# Patient Record
Sex: FEMALE | Race: BLACK OR AFRICAN AMERICAN | Employment: FULL TIME | URBAN - METROPOLITAN AREA
[De-identification: names, ages, dates, MRNs, and addresses within clinical notes are randomized per-mention and may not be internally consistent; named-entity substitution may affect disease eponyms.]

---

## 2022-12-29 ENCOUNTER — OFFICE VISIT (OUTPATIENT)
Dept: OCCUPATIONAL MEDICINE | Age: 27
End: 2022-12-29

## 2022-12-29 VITALS
BODY MASS INDEX: 38.45 KG/M2 | DIASTOLIC BLOOD PRESSURE: 75 MMHG | TEMPERATURE: 98.7 F | OXYGEN SATURATION: 96 % | SYSTOLIC BLOOD PRESSURE: 113 MMHG | RESPIRATION RATE: 16 BRPM | WEIGHT: 245 LBS | HEART RATE: 104 BPM | HEIGHT: 67 IN

## 2022-12-29 DIAGNOSIS — J02.0 STREP THROAT: ICD-10-CM

## 2022-12-29 DIAGNOSIS — J02.9 SORE THROAT: Primary | ICD-10-CM

## 2022-12-29 PROBLEM — R87.612 LGSIL ON PAP SMEAR OF CERVIX: Status: ACTIVE | Noted: 2022-10-10

## 2022-12-29 PROBLEM — E28.2 PCOS (POLYCYSTIC OVARIAN SYNDROME): Status: ACTIVE | Noted: 2022-08-29

## 2022-12-29 LAB
GROUP A STREP ANTIGEN, POC: NEGATIVE
INFLUENZA A ANTIGEN, POC: NEGATIVE
INFLUENZA B ANTIGEN, POC: NEGATIVE
VALID INTERNAL CONTROL, POC: YES
VALID INTERNAL CONTROL, POC: YES

## 2022-12-29 RX ORDER — AMOXICILLIN 500 MG/1
500 CAPSULE ORAL 2 TIMES DAILY
Qty: 20 CAPSULE | Refills: 0 | Status: SHIPPED | OUTPATIENT
Start: 2022-12-29 | End: 2023-01-08

## 2022-12-29 ASSESSMENT — ENCOUNTER SYMPTOMS
SWOLLEN GLANDS: 1
VISUAL CHANGE: 0
VOMITING: 0
ABDOMINAL PAIN: 0
COUGH: 0
RHINORRHEA: 0
TROUBLE SWALLOWING: 0
SINUS PAIN: 0
DIARRHEA: 0
SINUS PRESSURE: 0
SHORTNESS OF BREATH: 0
CHANGE IN BOWEL HABIT: 0
SORE THROAT: 1
NAUSEA: 0
CHEST TIGHTNESS: 0

## 2022-12-29 ASSESSMENT — PATIENT HEALTH QUESTIONNAIRE - PHQ9
2. FEELING DOWN, DEPRESSED OR HOPELESS: 0
SUM OF ALL RESPONSES TO PHQ QUESTIONS 1-9: 1
1. LITTLE INTEREST OR PLEASURE IN DOING THINGS: 1
SUM OF ALL RESPONSES TO PHQ9 QUESTIONS 1 & 2: 1
SUM OF ALL RESPONSES TO PHQ QUESTIONS 1-9: 1

## 2022-12-29 NOTE — PROGRESS NOTES
PROGRESS NOTE    SUBJECTIVE     Pam Hussein is a 32 y.o. female seen for:    Chief Complaint    Pharyngitis     . Patient states that her sore throat yesterday (started on the right and is now on both sides of the throat) and then had body aches, chills, and night sweats last night. Patient did a covid test at her work today and it was negative. Patient states that she hasn't had an appetite to eat but has been drinking fine despite the sore throat. Patient left work today because her temperature was 100.7F and she was feeling lightheaded. Pharyngitis  This is a new problem. The current episode started yesterday. The problem occurs constantly. The problem has been gradually worsening. Associated symptoms include chills, congestion, diaphoresis, fatigue, a fever, headaches, myalgias, a sore throat and swollen glands. Pertinent negatives include no abdominal pain, anorexia, arthralgias, change in bowel habit, chest pain, coughing, joint swelling, nausea, neck pain, numbness, rash, urinary symptoms, vertigo, visual change, vomiting or weakness. The symptoms are aggravated by drinking and eating. She has tried drinking for the symptoms. The treatment provided no relief. Current Outpatient Medications   Medication Sig Dispense Refill    amoxicillin (AMOXIL) 500 MG capsule Take 1 capsule by mouth 2 times daily for 10 days 20 capsule 0     No current facility-administered medications for this visit. No Known Allergies  Past Medical History:   Diagnosis Date    PCOS (polycystic ovarian syndrome)      No past surgical history on file.     Social History     Tobacco Use    Smoking status: Never    Smokeless tobacco: Never   Vaping Use    Vaping Use: Never used   Substance Use Topics    Alcohol use: Yes     Comment: 1-2 glasses of wine per week    Drug use: Never        Family History   Problem Relation Age of Onset    Leukemia Mother     Heart Surgery Father        Review of Systems   Constitutional: Positive for appetite change, chills, diaphoresis, fatigue and fever. HENT:  Positive for congestion, ear pain and sore throat. Negative for postnasal drip, rhinorrhea, sinus pressure, sinus pain and trouble swallowing. Respiratory:  Negative for cough, chest tightness and shortness of breath. Cardiovascular:  Negative for chest pain. Gastrointestinal:  Negative for abdominal pain, anorexia, change in bowel habit, diarrhea, nausea and vomiting. Musculoskeletal:  Positive for myalgias. Negative for arthralgias, joint swelling and neck pain. Skin:  Negative for rash. Neurological:  Positive for light-headedness and headaches. Negative for dizziness, vertigo, weakness and numbness. Some lightheadedness while at work. Psychiatric/Behavioral:  Positive for dysphoric mood. Negative for suicidal ideas. OBJECTIVE    /75 (Site: Right Upper Arm, Position: Sitting, Cuff Size: Medium Adult)   Pulse (!) 104   Temp 98.7 °F (37.1 °C) (Oral)   Resp 16   Ht 5' 7\" (1.702 m)   Wt 245 lb (111.1 kg)   LMP 12/19/2022 (Exact Date)   SpO2 96%   BMI 38.37 kg/m²    PHQ-9 Total Score: 1 (12/29/2022  1:09 PM)    Physical Exam  Vitals reviewed. Constitutional:       General: She is not in acute distress. Appearance: Normal appearance. She is ill-appearing. She is not toxic-appearing or diaphoretic. HENT:      Head: Normocephalic. Jaw: No trismus. Right Ear: Hearing, tympanic membrane, ear canal and external ear normal. There is impacted cerumen. Left Ear: Hearing, tympanic membrane, ear canal and external ear normal. There is no impacted cerumen. Ears:      Comments: Cerumen in the right ear canal obstructs the view of the right TM. Nose: Congestion and rhinorrhea present. Right Nostril: No foreign body, epistaxis or occlusion. Left Nostril: No foreign body, epistaxis or occlusion. Right Turbinates: Not enlarged, swollen or pale.       Left Turbinates: Not enlarged, swollen or pale. Right Sinus: No maxillary sinus tenderness or frontal sinus tenderness. Left Sinus: No maxillary sinus tenderness or frontal sinus tenderness. Comments: Slightly reddened turbinates     Mouth/Throat:      Mouth: Mucous membranes are moist.      Pharynx: Oropharynx is clear. Uvula midline. Posterior oropharyngeal erythema present. No pharyngeal swelling, oropharyngeal exudate or uvula swelling. Tonsils: Tonsillar exudate present. No tonsillar abscesses. 2+ on the right. 2+ on the left. Comments: Erythemic appearance of the posterior pharynx and tonsils; white patches on bilateral tonsis. Eyes:      General: No scleral icterus. Right eye: No discharge. Left eye: No discharge. Extraocular Movements: Extraocular movements intact. Conjunctiva/sclera: Conjunctivae normal.      Pupils: Pupils are equal, round, and reactive to light. Cardiovascular:      Rate and Rhythm: Normal rate and regular rhythm. Pulses: Normal pulses. Radial pulses are 2+ on the right side and 2+ on the left side. Heart sounds: Normal heart sounds. No murmur heard. No friction rub. No gallop. Pulmonary:      Effort: Pulmonary effort is normal. No respiratory distress. Breath sounds: Normal breath sounds. No stridor. No wheezing, rhonchi or rales. Chest:      Chest wall: No tenderness. Musculoskeletal:         General: No swelling. Normal range of motion. Cervical back: Normal range of motion and neck supple. No rigidity. Lymphadenopathy:      Cervical: Cervical adenopathy present. Right cervical: Superficial cervical adenopathy present. Left cervical: Superficial cervical adenopathy present. Skin:     General: Skin is warm and dry. Capillary Refill: Capillary refill takes less than 2 seconds. Coloration: Skin is not jaundiced or pale. Findings: No erythema or rash.    Neurological:      General: No focal deficit present. Mental Status: She is alert and oriented to person, place, and time. Mental status is at baseline. Motor: No weakness. Coordination: Coordination normal.      Gait: Gait normal.   Psychiatric:         Mood and Affect: Mood normal.         Behavior: Behavior normal.         Thought Content: Thought content normal.         Judgment: Judgment normal.       Results for orders placed or performed in visit on 12/29/22   AMB POC RAPID STREP TEST   Result Value Ref Range    Valid Internal Control, POC Yes     Group A Strep Antigen, POC Negative Negative   AMB POC RAPID INFLUENZA TEST   Result Value Ref Range    Valid Internal Control, POC Yes     Influenza A Antigen, POC Negative Negative    Influenza B Antigen, POC Negative Negative    Centor score = 4    No results found for: WBC, HGB, HCT, MCV, PLT    No results found for: NA, K, CL, CO2, BUN, CREATININE, GLUCOSE, CALCIUM, PROT, LABALBU, BILITOT, ALKPHOS, AST, ALT, LABGLOM, GFRAA, AGRATIO, GLOB        No results found for: NA, K, CL, CO2, BUN, CREATININE, GLUCOSE, CALCIUM     No results found for: LABA1C    No results found for: CHOL  No results found for: TRIG  No results found for: HDL  No results found for: LDLCHOLESTEROL, LDLCALC  No results found for: LABVLDL, VLDL  No results found for: CHOLHDLRATIO    No results found for: TSHFT4, TSH, TSHREFLEX, [id number]    ASSESSMENT and [address]. was seen today for pharyngitis. Diagnoses and all orders for this visit:    Sore throat  -     AMB POC RAPID STREP TEST  -     AMB POC RAPID INFLUENZA TEST    Strep throat  -     amoxicillin (AMOXIL) 500 MG capsule; Take 1 capsule by mouth 2 times daily for 10 days    Explained to patient that she tested negative for influenza and Strep A on rapid tests done in this office today, but that her symptoms clinically align with strep throat (swollen tonsils with exudate, swollen lymph nodes in the neck, fever, and no cough).      Instructed patient to fill the prescription for Amoxicillin to treat the strep infection. Take the antibiotics for the full 10 days and do not stop once feeling better. Strep infections can cause heart and kidney problems if not treated completely so it is important to take the antibiotics for the full 10 days. Change your tooth brush about 24-48 hours after starting the antibiotics. Have other family members in the house tested for strep if they develop symptoms as well. Stay home from work today and for at least 24 hours on antibiotics. You can return to work after you've been on the antibiotics for 24 hours, are fever free, and feeling better (note provided to be off until 12/31/22; next day to work is 1/3/22). If not feeling better after 2-3 days on antibiotics, follow up with the employee wellness center. Antibiotics have risks and benefits, and  one risk of antibiotics is that they remove good bacteria from the gastrointestinal (GI) tract and other areas of the body, leading to stomach upset, nausea, diarrhea, or yeast infections. Taking probiotics while taking the antibiotics and for 3-4 weeks after stopping the antibiotic can help to replenish your good bacteria and reduce or prevent the side effects of antibiotics. You can get probiotics through a capsule supplement (look brands like Align or Culturelle over the counter) or through foods in your diet (such as, yogurt, Kefir, kimchi (a Don fermented cabbage dish), kombucha (a fermented tea),  sauerkraut (fermented cabbage). Please follow up if you experience frequent or watery diarrhea or abdominal pain. Advised ibuprofen or acetaminophen (take according to package instructions) for the sore throat and body aches/fever.     Reminded patient about the [employer] benefit offered through [facility], where employees (and their dependents) get free phone support, as well as referrals for in-person consultations with clinical, legal and financial professionals. To access these resources, call [phone], text BONNIE to [phone], or visit [employer] (password: [email or web address]). Return to the clinic for persisting/worsening symptoms or new complaints that arise. Discussed signs and symptoms that would warrant immediate evaluation including, but not limited to severe headache, blurred vision, speech disturbance, difficulty with ambulation/gait, numbness, tingling, weakness, syncope, chest pain, or shortness of breath. Side effects and risk vs benefits associated with medications prescribed were discussed with patient who verbalized understanding. Pattent verbalized understanding and agreement with above plan of care. Counseled on benefits of having a primary care provider which includes, but is not limited to, continuity of care and having a medical home when concerns arise. Also enforced that onsite clinic policy states that we are not to take the place of a primary care provider. I have reviewed the patient's medication list, past medical, family, social, and surgical history in detail and updated the patient record appropriately.      JOHNNY Hua - NP

## 2022-12-29 NOTE — LETTER
[address] [place] [id number]  Phone: [phone]  Fax: [phone]    JOHNNY Porter NP        December 29, 2022     Patient: Negar Greenberg   YOB: 1995   Date of Visit: 12/29/2022       To Whom It May Concern: It is my medical opinion that Negar Greenberg may return to work on Tuesday, January 3rd as long as fever free for 24 hours and feeling better. .    If you have any questions or concerns, please don't hesitate to call.     Sincerely,        JOHNNY Porter NP

## 2023-01-03 ENCOUNTER — TELEPHONE (OUTPATIENT)
Dept: OCCUPATIONAL MEDICINE | Age: 28
End: 2023-01-03

## 2023-01-03 NOTE — TELEPHONE ENCOUNTER
Called patient to follow up on symptoms after taking amoxicillin. Left message for patient to follow up with the [facility] (employee wellness center) at [phone] if they need anything else.      JOHNNY Sun - NP

## 2023-01-09 ENCOUNTER — OFFICE VISIT (OUTPATIENT)
Dept: OCCUPATIONAL MEDICINE | Age: 28
End: 2023-01-09

## 2023-01-09 VITALS
RESPIRATION RATE: 18 BRPM | DIASTOLIC BLOOD PRESSURE: 78 MMHG | BODY MASS INDEX: 38.19 KG/M2 | OXYGEN SATURATION: 96 % | HEIGHT: 67 IN | SYSTOLIC BLOOD PRESSURE: 114 MMHG | HEART RATE: 127 BPM | TEMPERATURE: 99.4 F | WEIGHT: 243.3 LBS

## 2023-01-09 DIAGNOSIS — R35.0 URINARY FREQUENCY: ICD-10-CM

## 2023-01-09 DIAGNOSIS — J10.1 INFLUENZA A: ICD-10-CM

## 2023-01-09 DIAGNOSIS — Z32.00 ENCOUNTER FOR PREGNANCY TEST, RESULT UNKNOWN: ICD-10-CM

## 2023-01-09 DIAGNOSIS — R05.1 ACUTE COUGH: Primary | ICD-10-CM

## 2023-01-09 DIAGNOSIS — R09.82 POST-NASAL DRIP: ICD-10-CM

## 2023-01-09 LAB
BILIRUBIN, URINE, POC: NEGATIVE
BLOOD URINE, POC: ABNORMAL
GLUCOSE URINE, POC: NEGATIVE
HCG, PREGNANCY, URINE, POC: NEGATIVE
INFLUENZA A ANTIGEN, POC: POSITIVE
INFLUENZA B ANTIGEN, POC: NEGATIVE
KETONES, URINE, POC: 30
LEUKOCYTE ESTERASE, URINE, POC: ABNORMAL
NITRITE, URINE, POC: NEGATIVE
PH, URINE, POC: 6.5 (ref 4.6–8)
PROTEIN,URINE, POC: 100
SARS-COV-2 RNA, POC: NEGATIVE
SPECIFIC GRAVITY, URINE, POC: 1.02 (ref 1–1.03)
URINALYSIS CLARITY, POC: CLEAR
URINALYSIS COLOR, POC: ABNORMAL
UROBILINOGEN, POC: ABNORMAL
VALID INTERNAL CONTROL, POC: YES

## 2023-01-09 RX ORDER — FLUTICASONE PROPIONATE 50 MCG
2 SPRAY, SUSPENSION (ML) NASAL DAILY
Qty: 16 G | Refills: 0 | Status: SHIPPED | OUTPATIENT
Start: 2023-01-09

## 2023-01-09 RX ORDER — FLUTICASONE PROPIONATE 50 MCG
2 SPRAY, SUSPENSION (ML) NASAL DAILY
Qty: 16 G | Refills: 0 | Status: SHIPPED | OUTPATIENT
Start: 2023-01-09 | End: 2023-01-09

## 2023-01-09 ASSESSMENT — ENCOUNTER SYMPTOMS
SHORTNESS OF BREATH: 0
EYE PAIN: 0
NAUSEA: 0
VOMITING: 0
EYE DISCHARGE: 0
SWOLLEN GLANDS: 0
COUGH: 1
DIARRHEA: 0
RHINORRHEA: 1
BACK PAIN: 0
TROUBLE SWALLOWING: 0
ABDOMINAL PAIN: 0
EYE REDNESS: 0
CHEST TIGHTNESS: 0
EYE ITCHING: 0
SINUS PAIN: 1
SORE THROAT: 1
WHEEZING: 0

## 2023-01-09 ASSESSMENT — PATIENT HEALTH QUESTIONNAIRE - PHQ9
SUM OF ALL RESPONSES TO PHQ9 QUESTIONS 1 & 2: 1
SUM OF ALL RESPONSES TO PHQ QUESTIONS 1-9: 1
1. LITTLE INTEREST OR PLEASURE IN DOING THINGS: 1
SUM OF ALL RESPONSES TO PHQ QUESTIONS 1-9: 1
2. FEELING DOWN, DEPRESSED OR HOPELESS: 0
SUM OF ALL RESPONSES TO PHQ QUESTIONS 1-9: 1
SUM OF ALL RESPONSES TO PHQ QUESTIONS 1-9: 1

## 2023-01-09 NOTE — LETTER
[address] [place] [id number]  Phone: [phone]  Fax: [phone]    JOHNNY Williamson NP        January 9, 2023     Patient: Gita Ramirez   YOB: 1995   Date of Visit: 1/9/2023       To Whom It May Concern: It is my medical opinion that Gita Ramirez may return to work on Thursday, January 12th as long as fever free for 24 hours without antipyretics. Patient may return to work sooner if feeling better and fever free. If you have any questions or concerns, please don't hesitate to call.     Sincerely,        JOHNNY Williamson NP

## 2023-01-09 NOTE — PROGRESS NOTES
PROGRESS NOTE    SUBJECTIVE     Pam Hussein is a 32 y.o. female seen for:    Chief Complaint    URI     . Patient has nearly completed amoxicillin prescribed on 12/29/23 for clinical bacterial pharyngitis. Patient states that she started feeling better with the antibiotics but then on Friday 1/6/23, she felt much worse and started having a dry cough. Patient states that her sore throat returned as well but was not as bad as it was on 12/29/23. Patient states that she had a fever (102F), chills, and body aches last night with some mild sweating as well. Patient states that her cough is now productive and she has pain across the front of her chest when she coughs. Patient denies chest pain when she is not coughing. Patient feels very fatigued with exertion but denies shortness of breath. Patient states that she had a headache (over her forehead and crown) this morning and took 400 mg Ibuprofen for it with some improvement in her symptoms. Patient notes urinary frequency - especially last night - which she found odd since she hasn't been hydrating well. Patient denies any burning or pain with urination or urinary hesitancy or urgency. Patient denies any lower abdominal pain or back or flank pain as well. Patient states that she has not been hydrating well (has only had 2 cans of ginger ale and green juice yesterday). Patient has had a decreased appetite since Saturday and has only had small amounts of foods here and there since Saturday. Patient states that she will get the feeling that her heart is racing and feeling lightheaded since having the fever. URI   This is a new problem. The current episode started in the past 7 days. The problem has been unchanged. The maximum temperature recorded prior to her arrival was 102 - 102.9 F. The fever has been present for Less than 1 day. Associated symptoms include coughing, headaches, rhinorrhea, sinus pain and a sore throat.  Pertinent negatives include no abdominal pain, chest pain, congestion, diarrhea, dysuria, ear pain, joint pain, joint swelling, nausea, neck pain, plugged ear sensation, rash, sneezing, swollen glands, vomiting or wheezing. She has tried NSAIDs for the symptoms. The treatment provided mild relief. Current Outpatient Medications   Medication Sig Dispense Refill    fluticasone (FLONASE) 50 MCG/ACT nasal spray 2 sprays by Each Nostril route daily 16 g 0     No current facility-administered medications for this visit. No Known Allergies  Past Medical History:   Diagnosis Date    PCOS (polycystic ovarian syndrome)      History reviewed. No pertinent surgical history. Social History     Tobacco Use    Smoking status: Never    Smokeless tobacco: Never   Vaping Use    Vaping Use: Never used   Substance Use Topics    Alcohol use: Yes     Comment: 1-2 glasses of wine per week    Drug use: Never        Family History   Problem Relation Age of Onset    Leukemia Mother     Heart Surgery Father        Review of Systems   Constitutional:  Positive for appetite change, chills, diaphoresis, fatigue and fever. Lack of appetite since becoming sick   HENT:  Positive for postnasal drip, rhinorrhea, sinus pain and sore throat. Negative for congestion, ear discharge, ear pain, sneezing and trouble swallowing. Eyes:  Negative for pain, discharge, redness, itching and visual disturbance. Respiratory:  Positive for cough. Negative for chest tightness, shortness of breath and wheezing. Cardiovascular:  Positive for palpitations. Negative for chest pain. Chest tenderness across the sternum only with coughing; patient noted some \"heart racing\" sensations but has not been hydrating well. Gastrointestinal:  Negative for abdominal pain, diarrhea, nausea and vomiting. Genitourinary:  Positive for frequency. Negative for difficulty urinating, dysuria, flank pain and hematuria. Musculoskeletal:  Positive for myalgias.  Negative for back pain, joint pain and neck pain. Skin:  Negative for rash. Neurological:  Positive for light-headedness and headaches. Negative for dizziness, syncope and weakness. Psychiatric/Behavioral:  Positive for dysphoric mood. Negative for suicidal ideas. OBJECTIVE    /78 (Site: Left Upper Arm, Position: Sitting, Cuff Size: Medium Adult)   Pulse (!) 127 Comment: after drinking 16 ounces of water  Temp 99.4 °F (37.4 °C) (Oral)   Resp 18   Ht 5' 7\" (1.702 m)   Wt 243 lb 4.8 oz (110.4 kg)   LMP 12/19/2022 (Exact Date) Comment: Patient not using condoms consistently  SpO2 96%   BMI 38.11 kg/m²    PHQ-9 Total Score: 1 (1/9/2023 11:02 AM)    Physical Exam  Vitals reviewed. Constitutional:       General: She is not in acute distress. Appearance: Normal appearance. She is ill-appearing. She is not toxic-appearing or diaphoretic. HENT:      Head: Normocephalic. Right Ear: Hearing, tympanic membrane, ear canal and external ear normal. There is no impacted cerumen. Left Ear: Hearing, tympanic membrane, ear canal and external ear normal. There is no impacted cerumen. Ears:      Comments: Cerumen in the right ear canal no longer obstructing the view of the right TM     Nose: Congestion and rhinorrhea present. Right Nostril: No foreign body, epistaxis or occlusion. Left Nostril: No foreign body, epistaxis or occlusion. Right Turbinates: Not enlarged, swollen or pale. Left Turbinates: Not enlarged, swollen or pale. Right Sinus: No maxillary sinus tenderness or frontal sinus tenderness. Left Sinus: No maxillary sinus tenderness or frontal sinus tenderness. Comments: Reddened turbinates     Mouth/Throat:      Mouth: Mucous membranes are moist.      Pharynx: Oropharynx is clear. Uvula midline. Posterior oropharyngeal erythema present. No pharyngeal swelling, oropharyngeal exudate or uvula swelling. Tonsils: No tonsillar exudate or tonsillar abscesses.  1+ on the right. 1+ on the left. Comments: Cobblestone and erythemic appearance of the posterior pharynx  Eyes:      General: No scleral icterus. Right eye: No discharge. Left eye: No discharge. Extraocular Movements: Extraocular movements intact. Conjunctiva/sclera: Conjunctivae normal.   Cardiovascular:      Rate and Rhythm: Regular rhythm. Tachycardia present. Pulses: Normal pulses. Radial pulses are 2+ on the right side and 2+ on the left side. Heart sounds: Normal heart sounds. No murmur heard. No friction rub. No gallop. Pulmonary:      Effort: Pulmonary effort is normal. No respiratory distress. Breath sounds: Normal breath sounds. No stridor. No wheezing, rhonchi or rales. Comments: Chest tenderness not reproducible to palpation; occurs across the sternum with coughing  Chest:      Chest wall: Tenderness present. Musculoskeletal:         General: No swelling. Normal range of motion. Cervical back: Normal range of motion and neck supple. No rigidity. Lymphadenopathy:      Cervical: No cervical adenopathy. Skin:     General: Skin is warm and dry. Capillary Refill: Capillary refill takes less than 2 seconds. Coloration: Skin is not jaundiced or pale. Findings: No erythema or rash. Neurological:      General: No focal deficit present. Mental Status: She is alert and oriented to person, place, and time. Mental status is at baseline. Motor: No weakness. Coordination: Coordination normal.      Gait: Gait normal.   Psychiatric:         Mood and Affect: Mood normal.         Behavior: Behavior normal.         Thought Content:  Thought content normal.         Judgment: Judgment normal.       Results for orders placed or performed in visit on 01/09/23   AMB POC SARS-COV-2 AND INFLUENZA A/B   Result Value Ref Range    SARS-COV-2 RNA, POC Negative     Influenza A Antigen, POC Positive Negative    Influenza B Antigen, POC Negative Negative   AMB POC URINALYSIS DIP STICK AUTO W/O MICRO   Result Value Ref Range    Color, Urine, POC Dark yellow     Clarity, Urine, POC clear     Glucose, Urine, POC Negative Negative    Bilirubin, Urine, POC Negative Negative    Ketones, Urine, POC 30 Negative    Specific Gravity, Urine, POC 1.025 1.001 - 1.035    Blood, Urine, POC trace Negative    pH, Urine, POC 6.5 4.6 - 8.0    Protein, Urine,  Negative    Urobilinogen, POC 4.0 EU/dL     Nitrate, Urine, POC Negative Negative    Leukocyte Esterase, Urine, POC Trace Negative   AMB POC URINE PREGNANCY TEST, VISUAL COLOR COMPARISON   Result Value Ref Range    Valid Internal Control, POC Yes     HCG, Pregnancy, Urine, POC Negative Negative        No results found for: WBC, HGB, HCT, MCV, PLT    No results found for: NA, K, CL, CO2, BUN, CREATININE, GLUCOSE, CALCIUM, PROT, LABALBU, BILITOT, ALKPHOS, AST, ALT, LABGLOM, GFRAA, AGRATIO, GLOB        No results found for: NA, K, CL, CO2, BUN, CREATININE, GLUCOSE, CALCIUM     No results found for: LABA1C    No results found for: CHOL  No results found for: TRIG  No results found for: HDL  No results found for: LDLCHOLESTEROL, LDLCALC  No results found for: LABVLDL, VLDL  No results found for: CHOLHDLRATIO    No results found for: TSHFT4, TSH, TSHREFLEX, QBH2WJE    ASSESSMENT and [address] Oleary was seen today for uri. Diagnoses and all orders for this visit:    Acute cough  -     AMB POC SARS-COV-2 AND INFLUENZA A/B    Urinary frequency  -     AMB POC URINALYSIS DIP STICK AUTO W/O MICRO  -     Culture, Urine;  Future    Encounter for pregnancy test, result unknown  -     AMB POC URINE PREGNANCY TEST, VISUAL COLOR COMPARISON    Post-nasal drip  -     Discontinue: fluticasone (FLONASE) 50 MCG/ACT nasal spray; 2 sprays by Each Nostril route daily  -     fluticasone (FLONASE) 50 MCG/ACT nasal spray; 2 sprays by Each Nostril route daily    Influenza A    Explained to patient that she tested negative for COVID-19 but POSITIVE for influenza A on rapid tests done in this office today. Symptoms generally last 1-2 weeks and tend to get progressively better over time. Recommended that the patient increase rest and fluids (work note provided for patient to be off work until 1/12/23, but patient can return to work sooner if fever free and feeling better). Advised ibuprofen or acetaminophen (take according to package instructions). Can take Benadryl 25-50mg at night to help dry up secretions and improve sleep as well. Finish doses of amoxicillin but educated patient that the antibiotic is only treating her previous bacterial pharyngitis infection but will not treat the influenza (viral illness). Patient's urine dip stick results in the clinic today have a small amount of ketones (likely from not eating much), small amount of leukocytes, blood and protein but negative nitrates. Given the patient's clinical symptoms (only urinary frequency), it is unlikely that she also has a urinary tract infection. Patient can call the clinic if her urinary symptoms worsen and take pyridium OTC (take according to package instructions) for her worsening symptoms. Will follow up in 3-5 days with urine culture results. Advised patient that her pregnancy test was negative today. Provided patient with 16 ounces of water in the clinic and her heart rate came down about 10 bpm to 127bpm. Advised patient to monitor her increased heart rate, the \"heart racing\" sensation, and lightheadedness and follow up with urgent care or the emergency room (depending on the severity of symptoms), if these symptoms persist despite improved hydration and consistent use of acetaminophen and ibuprofen (take according to package instructions). Educated patient on using the neti pot, including boiling the water first and letting it cool to a warm temperature before performing the sinus rinse.  When ready to perform sinus rinse, pour the warm water into the neti pot and add a saline packet to the water. Gentle swirl the water in the pot to distribute the contents of the saline packet. It is best to do neti pot in the morning and at night and use the Flonase after doing a sinus rinse. Gargle with the leftover salt water from the neti pot. If the neti pot cannot be tolerated, take hot showers or carefully inhale steam from a boiling pot of water over the stove or using devices that creat steam. Humidifiers can also be helpful to keep the air moist.    For the Flonase nasal spray, you have the option to use the spray once or twice a day - if once a day, spray 2 puffs into each nostril and if twice a day, spray 1 puff into each nostril. Spray with your head down and and try to spray within the nose as opposed to down the back of your throat. Flonase takes several days to a week to work so continue to use this daily to see the best effects. Can take Mucinex/Guaifenesin to help thin secretions and make it easier to cough up or remove secretions from the nose. Honey is also a natural alternative to help suppress a cough and has been shown to be just as effective as dextromethorphan (or \"DM\" type cough medicines). Honey has the added benefit of not interacting with certain medications that increase serotonin levels in the body, such as anti-depressants (SSRIs/SNRIs), anti-emetics (zofran), or migraine medications (triptans). Advised the patient to avoid DM cough medicines during the day especially when she has a productive cough, but can take DM cough medicines at night if the cough is keeping her up at night. About every hour, alternate between getting up and walking around for a few minutes and doing focused deep breathing where you breath deeply into your belly or imagine that someone is listening to your lungs with a stethoscope.  This will likely trigger coughing but it is important to open up the lungs and help remove secretions from the lungs to prevent pneumonia. Discussed the risks and benefits of tamiflu, including potential side effects of abdominal pain, nausea, and vomiting. Tamiflu can decrease symptoms by a half to a full day, but has variable research on whether it decreases the risk of pneumonia or hospitalization. Tamiflu is best when taken within 24-48 hours of symptom onset and patient is well outside that window so it is less likely to have benefit. Let patient's partner (present in room) know that if starts having flu-like symptoms it is likely Influenza A and if he is interested in Tamiflu to seek evaluation and treatment within 24-48 hours of symptom onset. Reminded patient about the CSDN benefit offered through [facility] [employer] [place], where employees (and their dependents) get free phone support, as well as referrals for in-person consultations with clinical, legal and financial professionals. To access these resources, call [phone], text HELLO to [phone], or visit [employer] (password: [email or web address]). Return to the clinic for persisting/worsening symptoms or new complaints that arise. Discussed signs and symptoms that would warrant immediate evaluation including, but not limited to severe headache, blurred vision, speech disturbance, difficulty with ambulation/gait, numbness, tingling, weakness, syncope, chest pain, or shortness of breath. Side effects and risk vs benefits associated with medications prescribed were discussed with patient who verbalized understanding. Pattent verbalized understanding and agreement with above plan of care. Counseled on benefits of having a primary care provider which includes, but is not limited to, continuity of care and having a medical home when concerns arise. Also enforced that onsite clinic policy states that we are not to take the place of a primary care provider.       I have reviewed the patient's medication list, past medical, family, social, and surgical history in detail and updated the patient record appropriately.      JOHNNY Bull - NP

## 2023-01-10 DIAGNOSIS — R35.0 URINARY FREQUENCY: ICD-10-CM

## 2023-01-11 ENCOUNTER — TELEPHONE (OUTPATIENT)
Dept: OCCUPATIONAL MEDICINE | Age: 28
End: 2023-01-11

## 2023-01-11 NOTE — TELEPHONE ENCOUNTER
Called patient to follow up on symptoms. Left message for patient to follow up with the [facility] (employee wellness center) at [phone] if they need anything else.      JOHNNY Lorenz - NP

## 2023-01-12 ENCOUNTER — TELEPHONE (OUTPATIENT)
Dept: OCCUPATIONAL MEDICINE | Age: 28
End: 2023-01-12

## 2023-01-12 LAB
BACTERIA SPEC CULT: NORMAL
SERVICE CMNT-IMP: NORMAL

## 2023-01-12 NOTE — TELEPHONE ENCOUNTER
Called patient to follow up on symptoms. Left message for patient to follow up with the [facility] (employee wellness center) at [phone] if they need anything else.     JOHNNY Gilmore - NP

## 2023-01-12 NOTE — TELEPHONE ENCOUNTER
Called patient to report final urine culture results (normal skin brinda present; does not represent a bacterial urinary tract infection requiring antibiotic treatment). Patient states that she is still coughing frequently since she returned to work today. Patient notes some mild lightheadedness today says it is no where near as bad as it was on Monday. Patient also notes that she has been hydrating well. Patient was going to go home and nap on her lunch break and see if she can go back to work. Advised patient to follow up with the employee wellness center if she needs a note to miss work for the rest of today and tomorrow. Patient verbalized understanding and agreed. Advised patient to follow up with the [facility] (employee wellness center) at [phone] if they need anything else.      JOHNNY Cherry - NP

## 2023-01-13 ENCOUNTER — OFFICE VISIT (OUTPATIENT)
Dept: OCCUPATIONAL MEDICINE | Age: 28
End: 2023-01-13

## 2023-01-13 VITALS
HEART RATE: 88 BPM | TEMPERATURE: 98.2 F | SYSTOLIC BLOOD PRESSURE: 138 MMHG | RESPIRATION RATE: 14 BRPM | OXYGEN SATURATION: 97 % | DIASTOLIC BLOOD PRESSURE: 81 MMHG

## 2023-01-13 DIAGNOSIS — J10.1 INFLUENZA A: ICD-10-CM

## 2023-01-13 DIAGNOSIS — R09.82 POST-NASAL DRIP: ICD-10-CM

## 2023-01-13 DIAGNOSIS — R05.1 ACUTE COUGH: Primary | ICD-10-CM

## 2023-01-13 RX ORDER — BENZONATATE 100 MG/1
100-200 CAPSULE ORAL 3 TIMES DAILY PRN
Qty: 16 CAPSULE | Refills: 0 | Status: SHIPPED | OUTPATIENT
Start: 2023-01-13 | End: 2023-01-17

## 2023-01-13 NOTE — PROGRESS NOTES
Subjective:      Patient ID: Martha Hogan is a 32 y.o. female. Cough  This is a new problem. Episode onset: Seen at the [facility] on 12/29/[facility]- treated for strep with amoxicillin twice a day for 10 days, was seen again on 1/9/23 with c/o cough, fatigue, fever- since 1/6/23 and test positive for Influenza A- she reports she was told stop antibiotics. Progression since onset: Cough is not improving. Pertinent negatives include no chills, fever, headaches or rash. Associated symptoms comments: Coughing so much it is hurting in her chest when she coughs. Treatments tried: Ibuprofen, gargling with warm salt water prn, throat lozenges. The treatment provided mild (Relief for just a short time) relief. Review of Systems   Constitutional:  Negative for chills and fever. Cardiovascular:         Chest tenderness with coughing and improves after coughing stops   Skin:  Negative for rash. Neurological:  Negative for light-headedness and headaches. Dizziness yesterday but not today     No Known Allergies   Current Outpatient Medications on File Prior to Visit   Medication Sig Dispense Refill    fluticasone (FLONASE) 50 MCG/ACT nasal spray 2 sprays by Each Nostril route daily (Patient not taking: Reported on 1/13/2023) 16 g 0     No current facility-administered medications on file prior to visit. Objective:   Physical Exam  Vitals reviewed. Constitutional:       General: She is not in acute distress. Appearance: Normal appearance. She is not ill-appearing. HENT:      Head: Normocephalic. Right Ear: Hearing normal.      Left Ear: Hearing normal.      Nose: Mucosal edema present. Right Sinus: No maxillary sinus tenderness or frontal sinus tenderness. Left Sinus: No maxillary sinus tenderness or frontal sinus tenderness. Mouth/Throat:      Lips: Pink. Mouth: Mucous membranes are moist.      Pharynx: Uvula midline. Posterior oropharyngeal erythema present.  No pharyngeal swelling, oropharyngeal exudate or uvula swelling. Tonsils: No tonsillar exudate or tonsillar abscesses. 1+ on the right. 1+ on the left. Cardiovascular:      Rate and Rhythm: Normal rate and regular rhythm. Heart sounds: Normal heart sounds. Pulmonary:      Effort: Pulmonary effort is normal. No respiratory distress. Breath sounds: Normal breath sounds. No stridor. No wheezing, rhonchi or rales. Neurological:      Mental Status: She is alert. She does not want POC testing Covid    Vitals:    01/13/23 1303   BP: 138/81   Pulse: 88   Resp: 14   Temp: 98.2 °F (36.8 °C)   SpO2: 97%          Assessment:       Diagnosis Orders   1. Acute cough  benzonatate (TESSALON) 100 MG capsule      2. Post-nasal drip        3. Influenza A               Plan:      P:    * Rxs provided: Tessalon Perles 100 mg one- two tabs po TID prn for 4 days, #16, NR- may cause drowsiness do not take if need to be alert. (OTC Delsym as directed as needed for cough if need to be alert)  Encouraged her to pick Flonase up from pharmacy- Need to use daily as prescribed and may take 3-5 days to get full benefits, Start Claritin daily x 5- 7 days  Recommendations: We discussed controlling post nasal drip will also help decrease cough and sore throat  May use cool-mist humidifier/Vaporizer at bedside/living quarters. Recommended saline rinses with OceanMist spray or using NettiPot. Encouraged increase fluid intake for adequate hydration. Tylenol/Ibuprofen for aches/pains. Throat lozenges, honey, or warm salt water gargles for sore throat. Informed symptoms should last for 5-7 days but may last up to 2 weeks. Cough may persist after cold symptoms resolve. Risks and benefits of medications reviewed with patient . Follow up: 3-5 days if not any better . Urgent Care if worsening or development of high fever over the weekend. Seek ER care for chest pain or SOB.     Patient voices understanding and agrees with the plan of care as described above. Labs performed/ordered:  Educational information:  Educational material reviewed and provided at checkout re:_____  Follow up: Patient was encouraged to return to the clinic and/or PCP if s/s persist or do not resolve completely. Also advised to seek emergent care if worsening signs and symptoms warrant immediate evaluation including, but not limited to HA, blurred vision, speech disturbance, difficulty with ambulation/gait, numbness, tingling, weakness, syncope, abdominal pain, chest pain, or shortness of breath. *Side effects, adverse effects, risks versus benefits associated with medications prescribed/recommended were discussed with the patient. Patient verbalized understanding. All questions answered.       * I have reviewed the patient's medication list, past medical, family, social, and surgical    history and updated the patient record appropriately      Social History     Socioeconomic History    Marital status: Single     Spouse name: Not on file    Number of children: Not on file    Years of education: Not on file    Highest education level: Not on file   Occupational History    Not on file   Tobacco Use    Smoking status: Never    Smokeless tobacco: Never   Vaping Use    Vaping Use: Never used   Substance and Sexual Activity    Alcohol use: Yes     Comment: 1-2 glasses of wine per week    Drug use: Never    Sexual activity: Yes     Partners: Male     Comment: one male partner; not using condoms for protection; open to having children   Other Topics Concern    Not on file   Social History Narrative    Not on file     Social Determinants of Health     Financial Resource Strain: Not on file   Food Insecurity: Not on file   Transportation Needs: Not on file   Physical Activity: Not on file   Stress: Not on file   Social Connections: Not on file   Intimate Partner Violence: Not on file   Housing Stability: Not on file     Past Medical History:   Diagnosis Date    PCOS (polycystic ovarian syndrome)      No past surgical history on file. Family History   Problem Relation Age of Onset    Leukemia Mother     Heart Surgery Father             Chastity Ardon.  Ana Jin CNP

## 2023-01-17 ENCOUNTER — TELEPHONE (OUTPATIENT)
Dept: OCCUPATIONAL MEDICINE | Age: 28
End: 2023-01-17

## 2023-01-17 NOTE — TELEPHONE ENCOUNTER
Called patient to follow up on symptoms. Left message for patient to follow up with the [facility] (employee wellness center) at [phone] if they need anything else.      JOHNNY Lenz - NP

## 2023-04-21 ENCOUNTER — HOSPITAL ENCOUNTER (EMERGENCY)
Age: 28
Discharge: HOME | End: 2023-04-21

## 2023-04-21 VITALS
HEART RATE: 88 BPM | SYSTOLIC BLOOD PRESSURE: 134 MMHG | WEIGHT: 245 LBS | RESPIRATION RATE: 16 BRPM | TEMPERATURE: 99 F | OXYGEN SATURATION: 100 % | HEIGHT: 67 IN | BODY MASS INDEX: 38.45 KG/M2 | DIASTOLIC BLOOD PRESSURE: 83 MMHG

## 2023-04-21 ASSESSMENT — LIFESTYLE VARIABLES
HOW MANY STANDARD DRINKS CONTAINING ALCOHOL DO YOU HAVE ON A TYPICAL DAY: PATIENT DOES NOT DRINK
HOW OFTEN DO YOU HAVE A DRINK CONTAINING ALCOHOL: NEVER

## 2023-04-21 ASSESSMENT — PAIN - FUNCTIONAL ASSESSMENT
PAIN_FUNCTIONAL_ASSESSMENT: ACTIVITIES ARE NOT PREVENTED
PAIN_FUNCTIONAL_ASSESSMENT: NONE - DENIES PAIN

## 2023-04-21 NOTE — ED NOTES
Pt stated that she was leaving because she had made previous plans for the evening     Neeta Chinchilla, MINH  04/21/23 [id number]

## 2023-04-21 NOTE — ED NOTES
[facility] for exposure during business hours and pt stated she was not able to wait because she has prior plans, let her know ER currently full and we would get to as soon as possible. Informed pt that if she could not stay to follow up with [facility]. [facility] to [facility] on-call stated pt would need to call to make appt to follow-up.        Noemi White  04/21/23 [id number]

## 2023-04-21 NOTE — ED TRIAGE NOTES
Was assisting with a thyroid biopsy and stuck with a needle on her right middle finger.   Requesting lab work to be drawn

## 2023-07-05 ENCOUNTER — OFFICE VISIT (OUTPATIENT)
Dept: OCCUPATIONAL MEDICINE | Age: 28
End: 2023-07-05

## 2023-07-05 VITALS
RESPIRATION RATE: 16 BRPM | WEIGHT: 250 LBS | HEART RATE: 69 BPM | TEMPERATURE: 98.4 F | OXYGEN SATURATION: 95 % | BODY MASS INDEX: 39.24 KG/M2 | DIASTOLIC BLOOD PRESSURE: 68 MMHG | SYSTOLIC BLOOD PRESSURE: 102 MMHG | HEIGHT: 67 IN

## 2023-07-05 DIAGNOSIS — R10.30 LOWER ABDOMINAL PAIN: Primary | ICD-10-CM

## 2023-07-05 DIAGNOSIS — R10.30 LOWER ABDOMINAL PAIN: ICD-10-CM

## 2023-07-05 LAB
BILIRUBIN, URINE, POC: NEGATIVE
BLOOD URINE, POC: NEGATIVE
GLUCOSE URINE, POC: NEGATIVE
KETONES, URINE, POC: NEGATIVE
LEUKOCYTE ESTERASE, URINE, POC: NEGATIVE
NITRITE, URINE, POC: NEGATIVE
PH, URINE, POC: 6.5 (ref 4.6–8)
PROTEIN,URINE, POC: NEGATIVE
SPECIFIC GRAVITY, URINE, POC: 1 (ref 1–1.03)
URINALYSIS CLARITY, POC: CLEAR
URINALYSIS COLOR, POC: YELLOW
UROBILINOGEN, POC: NORMAL

## 2023-07-05 ASSESSMENT — CROHNS DISEASE ACTIVITY INDEX (CDAI): CDAI SCORE: 0

## 2023-07-05 ASSESSMENT — ENCOUNTER SYMPTOMS
FLATUS: 0
BLOOD IN STOOL: 0
DIARRHEA: 1
CHEST TIGHTNESS: 0
NAUSEA: 0
HEMATOCHEZIA: 0
VOMITING: 0
ANAL BLEEDING: 0
BELCHING: 0
CONSTIPATION: 0
ABDOMINAL PAIN: 1
ABDOMINAL DISTENTION: 1
RECTAL PAIN: 0
SHORTNESS OF BREATH: 0

## 2023-07-05 ASSESSMENT — PATIENT HEALTH QUESTIONNAIRE - PHQ9
SUM OF ALL RESPONSES TO PHQ QUESTIONS 1-9: 0
SUM OF ALL RESPONSES TO PHQ QUESTIONS 1-9: 0
SUM OF ALL RESPONSES TO PHQ9 QUESTIONS 1 & 2: 0
2. FEELING DOWN, DEPRESSED OR HOPELESS: 0
SUM OF ALL RESPONSES TO PHQ QUESTIONS 1-9: 0
SUM OF ALL RESPONSES TO PHQ QUESTIONS 1-9: 0
1. LITTLE INTEREST OR PLEASURE IN DOING THINGS: 0

## 2023-07-05 NOTE — PROGRESS NOTES
AUTO W/O MICRO  -     Nuswab Vaginitis Plus (VG+); Future    Advised patient that her urine dipstick done in the clinic today was normal and showed she is hydrating well. Provided patient with Acetaminophen 1000 mg x 1 dose in the clinic today. Advised patient to take 2-3 doses of acetaminophen per day to see if better manages her pain (maximum of 3,000 mg acetaminophen per day). Continue to hydrate well - at least 64 ounces of water per day or more if diarrhea occurs/increases. Patient performed a vaginal self-swab for NuSwab test today - will check for gonorrhea, chlamydia, trichomoniasis, bacterial vaginosis and yeast. Will follow up with results when available - either Friday 7/7/23 or Monday 7/10/23. Advised patient to follow up with OBGYN regarding her pain as well. Reminded patient about the [employer] benefit offered through [facility], where employees (and their dependents) get free phone support, as well as referrals for in-person consultations with clinical, legal and financial professionals. To access these resources, call [phone], text HELLO to [phone], or visit [email or web address] (password: [email or web address]). Employees are provided 6 free counseling sessions per year through [employer]. Side effects and risk vs benefits associated with medications prescribed were discussed. Instructed patient to return to the clinic for persisting/worsening symptoms or new complaints that arise. Discussed signs and symptoms that would warrant immediate evaluation including, but not limited to severe headache, blurred vision, speech disturbance, difficulty with ambulation/gait, numbness, tingling, weakness, syncope, chest pain, or shortness of breath. Counseled on benefits of having a primary care provider which includes, but is not limited to, continuity of care and having a medical home when concerns arise.  Also, enforced that onsite clinic policy states that we are not to take the place

## 2023-07-07 ENCOUNTER — TELEPHONE (OUTPATIENT)
Dept: OCCUPATIONAL MEDICINE | Age: 28
End: 2023-07-07

## 2023-07-07 LAB
A VAGINAE DNA VAG QL NAA+PROBE: ABNORMAL SCORE
BVAB2 DNA VAG QL NAA+PROBE: ABNORMAL SCORE
C ALBICANS DNA VAG QL NAA+PROBE: POSITIVE
C GLABRATA DNA VAG QL NAA+PROBE: NEGATIVE
C TRACH RRNA SPEC QL NAA+PROBE: NEGATIVE
MEGA1 DNA VAG QL NAA+PROBE: ABNORMAL SCORE
N GONORRHOEA RRNA SPEC QL NAA+PROBE: NEGATIVE
SPECIMEN SOURCE: ABNORMAL
T VAGINALIS RRNA SPEC QL NAA+PROBE: NEGATIVE

## 2023-07-07 NOTE — TELEPHONE ENCOUNTER
Patient return [facility] reports she f/u with her GYN, Dr Jono Ramírez. Saad Lomeli. Abdominal US was done and showed cyst on right ovary that ruptured. Patient's GYN prescribed an antiinflammatory and she is picking that up at the pharmacy today. If her symptoms are not improving with anti-inflammatory she will f/u with GYN again.   Urgent Care /ER if worsening symptoms over the weekend  We discussed the lab work is not resulted yet and we will call next week when those results are available  She denies questions or concerns

## 2023-07-10 ENCOUNTER — TELEPHONE (OUTPATIENT)
Dept: OCCUPATIONAL MEDICINE | Age: 28
End: 2023-07-10

## 2023-07-10 DIAGNOSIS — B37.9 YEAST INFECTION: Primary | ICD-10-CM

## 2023-07-10 RX ORDER — FLUCONAZOLE 150 MG/1
150 TABLET ORAL
Qty: 2 TABLET | Refills: 0 | Status: SHIPPED | OUTPATIENT
Start: 2023-07-10 | End: 2023-07-16

## 2023-07-10 NOTE — TELEPHONE ENCOUNTER
Called patient to follow up on symptoms and report results of NuSwab test. Left message for patient to follow up with the [facility] (employee wellness center) at [phone] if she needs anything else.      JOHNNY Kilpatrick NP

## 2023-07-10 NOTE — TELEPHONE ENCOUNTER
she's had yeast infections in the past but doesn't get them frequently. Patient denies recent antibiotic use. Patient notes that she has more irritation after she's walked and become sweaty or when she wears non-cotton underwear. Discussed other ways to prevent yeast infections in the future and the need to pay attention to the things that specifically cause yeast infections for her. Orders Placed This Encounter    fluconazole (DIFLUCAN) 150 MG tablet     Sig: Take 1 tablet by mouth every 72 hours for 6 days     Dispense:  2 tablet     Refill:  0     Instructed the patient to take diflucan x 1 once she picks up the prescription and that she can repeat the dose after 3 days if she's still experiencing the vaginal irritation. Explained to the patient that the diflucan will likely not help her abdominal pain much and that most of the abdominal pain is likely coming from her ovarian cyst identified on ultrasound at [facility] [place] office on 7/6/23. Patient has a history of PCOS as well. Advised patient to follow up if anything changes or worsens. Patient verbalized understanding and agreed with the above plan of care. Advised patient to follow up with the [facility] (employee wellness center) at [phone] if she needs anything else.      JOHNNY Kulkarni - TRACIE